# Patient Record
Sex: FEMALE | Race: OTHER | HISPANIC OR LATINO | URBAN - METROPOLITAN AREA
[De-identification: names, ages, dates, MRNs, and addresses within clinical notes are randomized per-mention and may not be internally consistent; named-entity substitution may affect disease eponyms.]

---

## 2017-07-17 ENCOUNTER — OUTPATIENT (OUTPATIENT)
Dept: OUTPATIENT SERVICES | Facility: HOSPITAL | Age: 32
LOS: 1 days | End: 2017-07-17
Payer: MEDICARE

## 2017-07-17 DIAGNOSIS — Z01.818 ENCOUNTER FOR OTHER PREPROCEDURAL EXAMINATION: ICD-10-CM

## 2017-07-17 LAB
ANION GAP SERPL CALC-SCNC: 13 MMOL/L — SIGNIFICANT CHANGE UP (ref 5–17)
APTT BLD: 26 SEC — LOW (ref 27.5–37.4)
BASOPHILS NFR BLD AUTO: 0.1 % — SIGNIFICANT CHANGE UP (ref 0–2)
BLD GP AB SCN SERPL QL: NEGATIVE — SIGNIFICANT CHANGE UP
BUN SERPL-MCNC: 10 MG/DL — SIGNIFICANT CHANGE UP (ref 7–23)
CALCIUM SERPL-MCNC: 9.6 MG/DL — SIGNIFICANT CHANGE UP (ref 8.4–10.5)
CHLORIDE SERPL-SCNC: 103 MMOL/L — SIGNIFICANT CHANGE UP (ref 96–108)
CO2 SERPL-SCNC: 22 MMOL/L — SIGNIFICANT CHANGE UP (ref 22–31)
CREAT SERPL-MCNC: 0.7 MG/DL — SIGNIFICANT CHANGE UP (ref 0.5–1.3)
EOSINOPHIL NFR BLD AUTO: 2 % — SIGNIFICANT CHANGE UP (ref 0–6)
GLUCOSE SERPL-MCNC: 96 MG/DL — SIGNIFICANT CHANGE UP (ref 70–99)
HCT VFR BLD CALC: 35.1 % — SIGNIFICANT CHANGE UP (ref 34.5–45)
HGB BLD-MCNC: 11.8 G/DL — SIGNIFICANT CHANGE UP (ref 11.5–15.5)
INR BLD: 1 — SIGNIFICANT CHANGE UP (ref 0.88–1.16)
LYMPHOCYTES # BLD AUTO: 23.3 % — SIGNIFICANT CHANGE UP (ref 13–44)
MCHC RBC-ENTMCNC: 27.6 PG — SIGNIFICANT CHANGE UP (ref 27–34)
MCHC RBC-ENTMCNC: 33.6 G/DL — SIGNIFICANT CHANGE UP (ref 32–36)
MCV RBC AUTO: 82.2 FL — SIGNIFICANT CHANGE UP (ref 80–100)
MONOCYTES NFR BLD AUTO: 9 % — SIGNIFICANT CHANGE UP (ref 2–14)
NEUTROPHILS NFR BLD AUTO: 65.6 % — SIGNIFICANT CHANGE UP (ref 43–77)
PLATELET # BLD AUTO: 298 K/UL — SIGNIFICANT CHANGE UP (ref 150–400)
POTASSIUM SERPL-MCNC: 4.4 MMOL/L — SIGNIFICANT CHANGE UP (ref 3.5–5.3)
POTASSIUM SERPL-SCNC: 4.4 MMOL/L — SIGNIFICANT CHANGE UP (ref 3.5–5.3)
PROTHROM AB SERPL-ACNC: 11.1 SEC — SIGNIFICANT CHANGE UP (ref 9.8–12.7)
RBC # BLD: 4.27 M/UL — SIGNIFICANT CHANGE UP (ref 3.8–5.2)
RBC # FLD: 14.2 % — SIGNIFICANT CHANGE UP (ref 10.3–16.9)
RH IG SCN BLD-IMP: POSITIVE — SIGNIFICANT CHANGE UP
SODIUM SERPL-SCNC: 138 MMOL/L — SIGNIFICANT CHANGE UP (ref 135–145)
WBC # BLD: 7.5 K/UL — SIGNIFICANT CHANGE UP (ref 3.8–10.5)
WBC # FLD AUTO: 7.5 K/UL — SIGNIFICANT CHANGE UP (ref 3.8–10.5)

## 2017-07-17 PROCEDURE — 85610 PROTHROMBIN TIME: CPT

## 2017-07-17 PROCEDURE — 85730 THROMBOPLASTIN TIME PARTIAL: CPT

## 2017-07-17 PROCEDURE — 86901 BLOOD TYPING SEROLOGIC RH(D): CPT

## 2017-07-17 PROCEDURE — 86900 BLOOD TYPING SEROLOGIC ABO: CPT

## 2017-07-17 PROCEDURE — 85025 COMPLETE CBC W/AUTO DIFF WBC: CPT

## 2017-07-17 PROCEDURE — 86850 RBC ANTIBODY SCREEN: CPT

## 2017-07-17 PROCEDURE — 80048 BASIC METABOLIC PNL TOTAL CA: CPT

## 2017-07-22 ENCOUNTER — INPATIENT (INPATIENT)
Facility: HOSPITAL | Age: 32
LOS: 3 days | Discharge: HOME | End: 2017-07-26
Attending: PEDIATRICS | Admitting: PEDIATRICS
Payer: COMMERCIAL

## 2017-07-22 VITALS — HEIGHT: 62 IN | WEIGHT: 209 LBS

## 2017-07-22 DIAGNOSIS — O26.899 OTHER SPECIFIED PREGNANCY RELATED CONDITIONS, UNSPECIFIED TRIMESTER: ICD-10-CM

## 2017-07-22 DIAGNOSIS — Z3A.00 WEEKS OF GESTATION OF PREGNANCY NOT SPECIFIED: ICD-10-CM

## 2017-07-22 LAB
BLD GP AB SCN SERPL QL: NEGATIVE — SIGNIFICANT CHANGE UP
RH IG SCN BLD-IMP: POSITIVE — SIGNIFICANT CHANGE UP

## 2017-07-22 RX ORDER — DOCUSATE SODIUM 100 MG
100 CAPSULE ORAL
Qty: 0 | Refills: 0 | Status: DISCONTINUED | OUTPATIENT
Start: 2017-07-22 | End: 2017-07-26

## 2017-07-22 RX ORDER — DIPHENHYDRAMINE HCL 50 MG
25 CAPSULE ORAL EVERY 6 HOURS
Qty: 0 | Refills: 0 | Status: DISCONTINUED | OUTPATIENT
Start: 2017-07-22 | End: 2017-07-26

## 2017-07-22 RX ORDER — NALOXONE HYDROCHLORIDE 4 MG/.1ML
0.1 SPRAY NASAL
Qty: 0 | Refills: 0 | Status: DISCONTINUED | OUTPATIENT
Start: 2017-07-22 | End: 2017-07-26

## 2017-07-22 RX ORDER — TETANUS TOXOID, REDUCED DIPHTHERIA TOXOID AND ACELLULAR PERTUSSIS VACCINE, ADSORBED 5; 2.5; 8; 8; 2.5 [IU]/.5ML; [IU]/.5ML; UG/.5ML; UG/.5ML; UG/.5ML
0.5 SUSPENSION INTRAMUSCULAR ONCE
Qty: 0 | Refills: 0 | Status: DISCONTINUED | OUTPATIENT
Start: 2017-07-22 | End: 2017-07-26

## 2017-07-22 RX ORDER — HEPARIN SODIUM 5000 [USP'U]/ML
5000 INJECTION INTRAVENOUS; SUBCUTANEOUS EVERY 12 HOURS
Qty: 0 | Refills: 0 | Status: DISCONTINUED | OUTPATIENT
Start: 2017-07-22 | End: 2017-07-26

## 2017-07-22 RX ORDER — LANOLIN
1 OINTMENT (GRAM) TOPICAL
Qty: 0 | Refills: 0 | Status: DISCONTINUED | OUTPATIENT
Start: 2017-07-22 | End: 2017-07-26

## 2017-07-22 RX ORDER — OXYCODONE AND ACETAMINOPHEN 5; 325 MG/1; MG/1
2 TABLET ORAL EVERY 6 HOURS
Qty: 0 | Refills: 0 | Status: DISCONTINUED | OUTPATIENT
Start: 2017-07-22 | End: 2017-07-26

## 2017-07-22 RX ORDER — ONDANSETRON 8 MG/1
4 TABLET, FILM COATED ORAL EVERY 6 HOURS
Qty: 0 | Refills: 0 | Status: DISCONTINUED | OUTPATIENT
Start: 2017-07-22 | End: 2017-07-26

## 2017-07-22 RX ORDER — MORPHINE SULFATE 50 MG/1
0.3 CAPSULE, EXTENDED RELEASE ORAL ONCE
Qty: 0 | Refills: 0 | Status: DISCONTINUED | OUTPATIENT
Start: 2017-07-22 | End: 2017-07-26

## 2017-07-22 RX ORDER — OXYCODONE AND ACETAMINOPHEN 5; 325 MG/1; MG/1
1 TABLET ORAL
Qty: 0 | Refills: 0 | Status: DISCONTINUED | OUTPATIENT
Start: 2017-07-22 | End: 2017-07-26

## 2017-07-22 RX ORDER — OXYTOCIN 10 UNIT/ML
41.67 VIAL (ML) INJECTION
Qty: 20 | Refills: 0 | Status: DISCONTINUED | OUTPATIENT
Start: 2017-07-22 | End: 2017-07-26

## 2017-07-22 RX ORDER — SODIUM CHLORIDE 9 MG/ML
1000 INJECTION, SOLUTION INTRAVENOUS
Qty: 0 | Refills: 0 | Status: DISCONTINUED | OUTPATIENT
Start: 2017-07-22 | End: 2017-07-22

## 2017-07-22 RX ORDER — FERROUS SULFATE 325(65) MG
325 TABLET ORAL DAILY
Qty: 0 | Refills: 0 | Status: DISCONTINUED | OUTPATIENT
Start: 2017-07-22 | End: 2017-07-24

## 2017-07-22 RX ORDER — SIMETHICONE 80 MG/1
80 TABLET, CHEWABLE ORAL EVERY 4 HOURS
Qty: 0 | Refills: 0 | Status: DISCONTINUED | OUTPATIENT
Start: 2017-07-22 | End: 2017-07-26

## 2017-07-22 RX ORDER — CITRIC ACID/SODIUM CITRATE 300-500 MG
30 SOLUTION, ORAL ORAL ONCE
Qty: 0 | Refills: 0 | Status: COMPLETED | OUTPATIENT
Start: 2017-07-22 | End: 2017-07-22

## 2017-07-22 RX ORDER — METOCLOPRAMIDE HCL 10 MG
10 TABLET ORAL ONCE
Qty: 0 | Refills: 0 | Status: DISCONTINUED | OUTPATIENT
Start: 2017-07-22 | End: 2017-07-22

## 2017-07-22 RX ORDER — IBUPROFEN 200 MG
600 TABLET ORAL ONCE
Qty: 0 | Refills: 0 | Status: COMPLETED | OUTPATIENT
Start: 2017-07-22 | End: 2017-07-23

## 2017-07-22 RX ORDER — SODIUM CHLORIDE 9 MG/ML
1000 INJECTION, SOLUTION INTRAVENOUS ONCE
Qty: 0 | Refills: 0 | Status: COMPLETED | OUTPATIENT
Start: 2017-07-22 | End: 2017-07-22

## 2017-07-22 RX ORDER — ACETAMINOPHEN 500 MG
650 TABLET ORAL EVERY 6 HOURS
Qty: 0 | Refills: 0 | Status: DISCONTINUED | OUTPATIENT
Start: 2017-07-22 | End: 2017-07-26

## 2017-07-22 RX ORDER — GLYCERIN ADULT
1 SUPPOSITORY, RECTAL RECTAL AT BEDTIME
Qty: 0 | Refills: 0 | Status: DISCONTINUED | OUTPATIENT
Start: 2017-07-22 | End: 2017-07-26

## 2017-07-22 RX ORDER — SODIUM CHLORIDE 9 MG/ML
1000 INJECTION, SOLUTION INTRAVENOUS
Qty: 0 | Refills: 0 | Status: DISCONTINUED | OUTPATIENT
Start: 2017-07-22 | End: 2017-07-26

## 2017-07-22 RX ORDER — CEFAZOLIN SODIUM 1 G
2000 VIAL (EA) INJECTION ONCE
Qty: 0 | Refills: 0 | Status: COMPLETED | OUTPATIENT
Start: 2017-07-22 | End: 2017-07-22

## 2017-07-22 RX ORDER — IBUPROFEN 200 MG
600 TABLET ORAL EVERY 6 HOURS
Qty: 0 | Refills: 0 | Status: DISCONTINUED | OUTPATIENT
Start: 2017-07-22 | End: 2017-07-26

## 2017-07-22 RX ADMIN — SODIUM CHLORIDE 2000 MILLILITER(S): 9 INJECTION, SOLUTION INTRAVENOUS at 14:26

## 2017-07-22 RX ADMIN — Medication 100 MILLIGRAM(S): at 14:27

## 2017-07-22 RX ADMIN — Medication 30 MILLILITER(S): at 14:27

## 2017-07-23 LAB
BASOPHILS NFR BLD AUTO: 0.1 % — SIGNIFICANT CHANGE UP (ref 0–2)
EOSINOPHIL NFR BLD AUTO: 0.3 % — SIGNIFICANT CHANGE UP (ref 0–6)
HCT VFR BLD CALC: 26.8 % — LOW (ref 34.5–45)
HGB BLD-MCNC: 8.8 G/DL — LOW (ref 11.5–15.5)
LYMPHOCYTES # BLD AUTO: 11.8 % — LOW (ref 13–44)
MCHC RBC-ENTMCNC: 27.8 PG — SIGNIFICANT CHANGE UP (ref 27–34)
MCHC RBC-ENTMCNC: 32.8 G/DL — SIGNIFICANT CHANGE UP (ref 32–36)
MCV RBC AUTO: 84.5 FL — SIGNIFICANT CHANGE UP (ref 80–100)
MONOCYTES NFR BLD AUTO: 9.7 % — SIGNIFICANT CHANGE UP (ref 2–14)
NEUTROPHILS NFR BLD AUTO: 78.1 % — HIGH (ref 43–77)
PLATELET # BLD AUTO: 205 K/UL — SIGNIFICANT CHANGE UP (ref 150–400)
RBC # BLD: 3.17 M/UL — LOW (ref 3.8–5.2)
RBC # FLD: 14.1 % — SIGNIFICANT CHANGE UP (ref 10.3–16.9)
T PALLIDUM AB TITR SER: NEGATIVE — SIGNIFICANT CHANGE UP
WBC # BLD: 10.1 K/UL — SIGNIFICANT CHANGE UP (ref 3.8–10.5)
WBC # FLD AUTO: 10.1 K/UL — SIGNIFICANT CHANGE UP (ref 3.8–10.5)

## 2017-07-23 RX ADMIN — Medication 1 TABLET(S): at 11:51

## 2017-07-23 RX ADMIN — Medication 325 MILLIGRAM(S): at 11:50

## 2017-07-23 RX ADMIN — SODIUM CHLORIDE 125 MILLILITER(S): 9 INJECTION, SOLUTION INTRAVENOUS at 03:19

## 2017-07-23 RX ADMIN — Medication 600 MILLIGRAM(S): at 11:51

## 2017-07-23 RX ADMIN — Medication 600 MILLIGRAM(S): at 12:59

## 2017-07-23 RX ADMIN — Medication 600 MILLIGRAM(S): at 05:52

## 2017-07-23 RX ADMIN — HEPARIN SODIUM 5000 UNIT(S): 5000 INJECTION INTRAVENOUS; SUBCUTANEOUS at 05:53

## 2017-07-23 RX ADMIN — Medication 600 MILLIGRAM(S): at 19:22

## 2017-07-23 RX ADMIN — HEPARIN SODIUM 5000 UNIT(S): 5000 INJECTION INTRAVENOUS; SUBCUTANEOUS at 18:21

## 2017-07-23 RX ADMIN — Medication 600 MILLIGRAM(S): at 18:21

## 2017-07-23 RX ADMIN — Medication 600 MILLIGRAM(S): at 06:44

## 2017-07-23 NOTE — PROGRESS NOTE ADULT - SUBJECTIVE AND OBJECTIVE BOX
Patient evaluated at bedside. No acute events overnight. She reports pain is well controlled with OPM. Adequate urine output. Has not yet had anything PO nor attempted ambulation. Has not passed flatus as of yet. She denies headache, dizziness, chest pain, palpitations, shortness of breathe, nausea, vomiting or heavy vaginal bleeding.      Physical Exam:  Vital Signs Last 24 Hrs  T(C): 37.1 (23 Jul 2017 06:00), Max: 37.1 (23 Jul 2017 06:00)  T(F): 98.8 (23 Jul 2017 06:00), Max: 98.8 (23 Jul 2017 06:00)  HR: 95 (23 Jul 2017 06:00) (86 - 110)  BP: 115/80 (23 Jul 2017 06:00) (97/52 - 123/57)  BP(mean): --  RR: 18 (23 Jul 2017 06:00) (16 - 24)  SpO2: 96% (23 Jul 2017 06:00) (94% - 99%)    GA: NAD, A+0 x 3  Abd: + BS, soft, nontender, nondistended, no rebound or guarding, uterus firm at midline, 2 fb below umbilicus  Incision clean, dry and intact - prevena  : lochia WNL  Extremities: no swelling or calf tenderness                            8.8    10.1  )-----------( 205      ( 23 Jul 2017 06:02 )             26.8

## 2017-07-23 NOTE — PROGRESS NOTE ADULT - ASSESSMENT
A/P  yo 32y s/p c/s, POD #1  ,stable    Diet: clears, advance with flatus  Pain: OPM  IV fluid: Saline lock  OOB/SCDs/IS  SQH 5000 BID  TOV  f/u AM labs  Continue to monitor  Anticipate discharge POD #3 or #4

## 2017-07-23 NOTE — PROGRESS NOTE ADULT - SUBJECTIVE AND OBJECTIVE BOX
Pt evaluated at bedside given recent drop in Hgb (11.8> 8.8).  Pt reports she is feeling good and has no complaints.  Denies heart palpitations, chest pain, weakness, difficulty with ambulation, lightheadness/dizziness    Reports urinating frequently without difficulty.  Urine Output 600> 100> 400    Physical Exam:  -826/67-80  HR   CV: s1, s2, no m/r/g  Resp: normal breath sounds throughout, no wheezes/rhonchi/rales  Abd: soft, non tender, BS+  Ext: no edema/erythema/tenderness    A/P:  Continue to monitor for signs of anemia.  F/u on morning CBC.

## 2017-07-24 LAB
HCT VFR BLD CALC: 28 % — LOW (ref 34.5–45)
HGB BLD-MCNC: 9.1 G/DL — LOW (ref 11.5–15.5)
MCHC RBC-ENTMCNC: 27.8 PG — SIGNIFICANT CHANGE UP (ref 27–34)
MCHC RBC-ENTMCNC: 32.5 G/DL — SIGNIFICANT CHANGE UP (ref 32–36)
MCV RBC AUTO: 85.6 FL — SIGNIFICANT CHANGE UP (ref 80–100)
PLATELET # BLD AUTO: 284 K/UL — SIGNIFICANT CHANGE UP (ref 150–400)
RBC # BLD: 3.27 M/UL — LOW (ref 3.8–5.2)
RBC # FLD: 14.6 % — SIGNIFICANT CHANGE UP (ref 10.3–16.9)
WBC # BLD: 17 K/UL — HIGH (ref 3.8–10.5)
WBC # FLD AUTO: 17 K/UL — HIGH (ref 3.8–10.5)

## 2017-07-24 RX ORDER — FERROUS SULFATE 325(65) MG
325 TABLET ORAL
Qty: 0 | Refills: 0 | Status: DISCONTINUED | OUTPATIENT
Start: 2017-07-24 | End: 2017-07-26

## 2017-07-24 RX ADMIN — HEPARIN SODIUM 5000 UNIT(S): 5000 INJECTION INTRAVENOUS; SUBCUTANEOUS at 05:57

## 2017-07-24 RX ADMIN — Medication 600 MILLIGRAM(S): at 00:12

## 2017-07-24 RX ADMIN — Medication 100 MILLIGRAM(S): at 05:58

## 2017-07-24 RX ADMIN — Medication 600 MILLIGRAM(S): at 18:23

## 2017-07-24 RX ADMIN — Medication 600 MILLIGRAM(S): at 01:00

## 2017-07-24 RX ADMIN — Medication 600 MILLIGRAM(S): at 12:27

## 2017-07-24 RX ADMIN — Medication 1 TABLET(S): at 12:27

## 2017-07-24 RX ADMIN — Medication 600 MILLIGRAM(S): at 13:03

## 2017-07-24 RX ADMIN — Medication 600 MILLIGRAM(S): at 05:58

## 2017-07-24 RX ADMIN — HEPARIN SODIUM 5000 UNIT(S): 5000 INJECTION INTRAVENOUS; SUBCUTANEOUS at 18:23

## 2017-07-24 RX ADMIN — Medication 325 MILLIGRAM(S): at 12:27

## 2017-07-24 RX ADMIN — Medication 600 MILLIGRAM(S): at 06:45

## 2017-07-24 RX ADMIN — Medication 600 MILLIGRAM(S): at 19:21

## 2017-07-24 RX ADMIN — Medication 325 MILLIGRAM(S): at 21:20

## 2017-07-24 NOTE — PROGRESS NOTE ADULT - ASSESSMENT
A/P  yo 32y s/p c/s, POD #2 ,stable    Diet: reg  Pain: OPM  IV fluid: Saline lock  Anemia - Fe TID  OOB/SCDs/IS  SQH 5000 BID  Continue to monitor  Anticipate discharge POD #3 or #4

## 2017-07-24 NOTE — PROGRESS NOTE ADULT - ASSESSMENT
POD 2 with blood loss from extensive ALBIN  For repeat CBC today and plan regarding transfusion pending results and symptoms.

## 2017-07-24 NOTE — PROGRESS NOTE ADULT - SUBJECTIVE AND OBJECTIVE BOX
Patient evaluated at bedside. She reports pain is well controlled with OPM. She has been ambulating without assistance, voiding spontaneously, passing gas, tolerating regular diet. She denies headache, dizziness, chest pain, palpitations, shortness of breathe, nausea, vomiting or heavy vaginal bleeding.      Physical Exam:  Vital Signs Last 24 Hrs  T(C): 36.8 (24 Jul 2017 05:16), Max: 36.8 (24 Jul 2017 05:16)  T(F): 98.3 (24 Jul 2017 05:16), Max: 98.3 (24 Jul 2017 05:16)  HR: 101 (24 Jul 2017 05:16) (92 - 101)  BP: 128/78 (24 Jul 2017 05:16) (114/67 - 128/78)  BP(mean): --  RR: 18 (24 Jul 2017 05:16) (18 - 20)  SpO2: 97% (24 Jul 2017 05:16) (96% - 99%)    GA: NAD, A+0 x 3  Abd: + BS, soft, nontender, nondistended, no rebound or guarding, uterus firm at midline, 2 fb below umbilicus  Incision clean, dry and intact - prevena  : lochia WNL  Extremities: no swelling or calf tenderness                            9.1    17.0  )-----------( 284      ( 24 Jul 2017 06:22 )             28.0

## 2017-07-24 NOTE — PROGRESS NOTE ADULT - SUBJECTIVE AND OBJECTIVE BOX
Notes pain but otherwise no complaints. Declines to take percocet  Denies signs of anemia at this time but is also not ambulating much    Vital Signs Last 24 Hrs  T(C): 36.8 (24 Jul 2017 05:16), Max: 36.8 (24 Jul 2017 05:16)  T(F): 98.3 (24 Jul 2017 05:16), Max: 98.3 (24 Jul 2017 05:16)  HR: 101 (24 Jul 2017 05:16) (92 - 101)  BP: 128/78 (24 Jul 2017 05:16) (104/55 - 128/78)  BP(mean): --  RR: 18 (24 Jul 2017 05:16) (17 - 20)  SpO2: 97% (24 Jul 2017 05:16) (96% - 99%)      Sitting in chair.   Morbidly obese  Abdomen non tender non distended.   Vacuum on and working.   No C/C/E

## 2017-07-25 RX ADMIN — Medication 600 MILLIGRAM(S): at 17:00

## 2017-07-25 RX ADMIN — Medication 325 MILLIGRAM(S): at 17:46

## 2017-07-25 RX ADMIN — Medication 600 MILLIGRAM(S): at 07:06

## 2017-07-25 RX ADMIN — Medication 100 MILLIGRAM(S): at 12:19

## 2017-07-25 RX ADMIN — Medication 600 MILLIGRAM(S): at 16:06

## 2017-07-25 RX ADMIN — Medication 650 MILLIGRAM(S): at 20:58

## 2017-07-25 RX ADMIN — Medication 325 MILLIGRAM(S): at 07:53

## 2017-07-25 RX ADMIN — HEPARIN SODIUM 5000 UNIT(S): 5000 INJECTION INTRAVENOUS; SUBCUTANEOUS at 17:46

## 2017-07-25 RX ADMIN — Medication 325 MILLIGRAM(S): at 12:19

## 2017-07-25 RX ADMIN — Medication 600 MILLIGRAM(S): at 01:09

## 2017-07-25 RX ADMIN — Medication 600 MILLIGRAM(S): at 06:16

## 2017-07-25 RX ADMIN — Medication 1 TABLET(S): at 12:19

## 2017-07-25 RX ADMIN — HEPARIN SODIUM 5000 UNIT(S): 5000 INJECTION INTRAVENOUS; SUBCUTANEOUS at 06:16

## 2017-07-25 RX ADMIN — Medication 600 MILLIGRAM(S): at 00:14

## 2017-07-25 RX ADMIN — Medication 100 MILLIGRAM(S): at 00:14

## 2017-07-25 NOTE — PROGRESS NOTE ADULT - SUBJECTIVE AND OBJECTIVE BOX
Patient evaluated at bedside. She reports pain is well controlled with OPM. She has been ambulating without assistance, voiding spontaneously, passing gas, tolerating regular diet. She denies headache, dizziness, chest pain, palpitations, shortness of breathe, nausea, vomiting or heavy vaginal bleeding.      Physical Exam:  Vital Signs Last 24 Hrs  T(C): 36.6 (25 Jul 2017 06:00), Max: 36.6 (25 Jul 2017 06:00)  T(F): 97.8 (25 Jul 2017 06:00), Max: 97.8 (25 Jul 2017 06:00)  HR: 105 (25 Jul 2017 06:00) (80 - 105)  BP: 118/66 (25 Jul 2017 06:00) (104/58 - 118/66)  BP(mean): --  RR: 18 (25 Jul 2017 06:00) (17 - 18)  SpO2: 96% (25 Jul 2017 06:00) (96% - 99%)    GA: NAD, A+0 x 3  Abd: + BS, soft, nontender, nondistended, no rebound or guarding, uterus firm at midline, 2 fb below umbilicus  Incision clean, dry and intact - Prevena   : lochia WNL  Extremities: no swelling or calf tenderness                            9.1    17.0  )-----------( 284      ( 24 Jul 2017 06:22 )             28.0

## 2017-07-25 NOTE — PROGRESS NOTE ADULT - ASSESSMENT
A/P  yo 32y s/p c/s, POD #3 ,stable    Diet: reg  Pain: OPM  IV fluid: Saline lock  Anemia - Fe TID  OOB/SCDs/IS  SQH 5000 BID  Continue to monitor  Anticipate discharge POD #3 or #4

## 2017-07-26 ENCOUNTER — TRANSCRIPTION ENCOUNTER (OUTPATIENT)
Age: 32
End: 2017-07-26

## 2017-07-26 VITALS
SYSTOLIC BLOOD PRESSURE: 118 MMHG | DIASTOLIC BLOOD PRESSURE: 63 MMHG | TEMPERATURE: 97 F | HEART RATE: 85 BPM | OXYGEN SATURATION: 100 % | RESPIRATION RATE: 17 BRPM

## 2017-07-26 PROCEDURE — 85027 COMPLETE CBC AUTOMATED: CPT

## 2017-07-26 PROCEDURE — 86901 BLOOD TYPING SEROLOGIC RH(D): CPT

## 2017-07-26 PROCEDURE — C1889: CPT

## 2017-07-26 PROCEDURE — 86850 RBC ANTIBODY SCREEN: CPT

## 2017-07-26 PROCEDURE — 99214 OFFICE O/P EST MOD 30 MIN: CPT

## 2017-07-26 PROCEDURE — 85025 COMPLETE CBC W/AUTO DIFF WBC: CPT

## 2017-07-26 PROCEDURE — 88307 TISSUE EXAM BY PATHOLOGIST: CPT

## 2017-07-26 PROCEDURE — 86780 TREPONEMA PALLIDUM: CPT

## 2017-07-26 PROCEDURE — 86900 BLOOD TYPING SEROLOGIC ABO: CPT

## 2017-07-26 PROCEDURE — 36415 COLL VENOUS BLD VENIPUNCTURE: CPT

## 2017-07-26 RX ORDER — ACETAMINOPHEN 500 MG
650 TABLET ORAL EVERY 6 HOURS
Qty: 0 | Refills: 0 | Status: DISCONTINUED | OUTPATIENT
Start: 2017-07-26 | End: 2017-07-26

## 2017-07-26 RX ADMIN — Medication 600 MILLIGRAM(S): at 01:20

## 2017-07-26 RX ADMIN — Medication 325 MILLIGRAM(S): at 13:00

## 2017-07-26 RX ADMIN — Medication 600 MILLIGRAM(S): at 06:13

## 2017-07-26 RX ADMIN — Medication 325 MILLIGRAM(S): at 08:03

## 2017-07-26 RX ADMIN — Medication 1 TABLET(S): at 13:00

## 2017-07-26 RX ADMIN — Medication 100 MILLIGRAM(S): at 00:24

## 2017-07-26 RX ADMIN — Medication 600 MILLIGRAM(S): at 00:24

## 2017-07-26 RX ADMIN — Medication 600 MILLIGRAM(S): at 14:10

## 2017-07-26 RX ADMIN — HEPARIN SODIUM 5000 UNIT(S): 5000 INJECTION INTRAVENOUS; SUBCUTANEOUS at 06:12

## 2017-07-26 RX ADMIN — Medication 600 MILLIGRAM(S): at 13:00

## 2017-07-26 RX ADMIN — Medication 600 MILLIGRAM(S): at 07:10

## 2017-07-26 NOTE — PROGRESS NOTE ADULT - SUBJECTIVE AND OBJECTIVE BOX
Patient evaluated at bedside. She reports pain is well controlled with OPM. She has been ambulating without assistance, voiding spontaneously, passing gas, tolerating regular diet. She denies headache, dizziness, chest pain, palpitations, shortness of breathe, nausea, vomiting or heavy vaginal bleeding.      Physical Exam:  Vital Signs Last 24 Hrs  T(C): 36.2 (26 Jul 2017 10:44), Max: 36.9 (25 Jul 2017 18:31)  T(F): 97.2 (26 Jul 2017 10:44), Max: 98.4 (25 Jul 2017 18:31)  HR: 85 (26 Jul 2017 10:44) (82 - 97)  BP: 118/63 (26 Jul 2017 10:44) (116/76 - 131/60)  BP(mean): --  RR: 17 (26 Jul 2017 10:44) (16 - 18)  SpO2: 100% (26 Jul 2017 10:44) (99% - 100%)    GA: NAD, A+0 x 3  CV: RRR  Pulm: CTAB  Abd: + BS, soft, nontender, nondistended, no rebound or guarding, uterus firm at midline, 2 fb below umbilicus  Incision clean, dry and intact -prevena   : lochia WNL  Extremities: no swelling or calf tenderness

## 2017-07-26 NOTE — PROGRESS NOTE ADULT - ASSESSMENT
A/P  yo 32y s/p c/s, POD # 4  ,stable    Diet: Regular diet  Pain: OPM  IV fluid: Saline lock  OOB/SCDs/IS  SQH 5000 BID  Continue to monitor  Anticipate discharge today  Pt declines percocet script

## 2017-07-26 NOTE — DISCHARGE NOTE OB - PATIENT PORTAL LINK FT
“You can access the FollowHealth Patient Portal, offered by [facility], by registering with the following website: [email or web address]”

## 2017-07-26 NOTE — DISCHARGE NOTE OB - CARE PROVIDER_API CALL
Kenyon Carrion), Obstetrics and Gynecology  [address] [place] [address]  Phone: [phone]  Fax: [phone]

## 2017-07-26 NOTE — DISCHARGE NOTE OB - CARE PLAN
Principal Discharge DX:	 delivery delivered  Goal:	be happy  Instructions for follow-up, activity and diet:	follow up with OBGYN in 6 weeks, pelvic rest for 6 weeks, normal healthy diet

## 2017-07-27 LAB — SURGICAL PATHOLOGY STUDY: SIGNIFICANT CHANGE UP

## 2017-07-28 DIAGNOSIS — D50.9 IRON DEFICIENCY ANEMIA, UNSPECIFIED: ICD-10-CM

## 2017-07-28 DIAGNOSIS — O34.211 MATERNAL CARE FOR LOW TRANSVERSE SCAR FROM PREVIOUS CESAREAN DELIVERY: ICD-10-CM

## 2017-07-28 DIAGNOSIS — E66.01 MORBID (SEVERE) OBESITY DUE TO EXCESS CALORIES: ICD-10-CM

## 2017-07-28 DIAGNOSIS — N73.6 FEMALE PELVIC PERITONEAL ADHESIONS (POSTINFECTIVE): ICD-10-CM

## 2017-07-28 DIAGNOSIS — Z3A.38 38 WEEKS GESTATION OF PREGNANCY: ICD-10-CM

## 2017-07-28 DIAGNOSIS — O99.89 OTHER SPECIFIED DISEASES AND CONDITIONS COMPLICATING PREGNANCY, CHILDBIRTH AND THE PUERPERIUM: ICD-10-CM

## 2017-07-28 DIAGNOSIS — Z34.83 ENCOUNTER FOR SUPERVISION OF OTHER NORMAL PREGNANCY, THIRD TRIMESTER: ICD-10-CM

## 2019-12-05 ENCOUNTER — APPOINTMENT (OUTPATIENT)
Dept: ANTEPARTUM | Facility: CLINIC | Age: 34
End: 2019-12-05
Payer: COMMERCIAL

## 2019-12-05 PROCEDURE — 76813 OB US NUCHAL MEAS 1 GEST: CPT

## 2019-12-05 PROCEDURE — 76801 OB US < 14 WKS SINGLE FETUS: CPT

## 2020-01-08 ENCOUNTER — APPOINTMENT (OUTPATIENT)
Dept: ANTEPARTUM | Facility: CLINIC | Age: 35
End: 2020-01-08
Payer: COMMERCIAL

## 2020-01-08 PROCEDURE — 76811 OB US DETAILED SNGL FETUS: CPT

## 2020-01-08 PROCEDURE — 76817 TRANSVAGINAL US OBSTETRIC: CPT

## 2020-02-05 ENCOUNTER — APPOINTMENT (OUTPATIENT)
Dept: ANTEPARTUM | Facility: CLINIC | Age: 35
End: 2020-02-05
Payer: COMMERCIAL

## 2020-02-05 PROCEDURE — 76811 OB US DETAILED SNGL FETUS: CPT

## 2020-02-05 PROCEDURE — 76817 TRANSVAGINAL US OBSTETRIC: CPT

## 2020-02-12 ENCOUNTER — APPOINTMENT (OUTPATIENT)
Dept: ANTEPARTUM | Facility: CLINIC | Age: 35
End: 2020-02-12
Payer: COMMERCIAL

## 2020-02-12 PROCEDURE — 76815 OB US LIMITED FETUS(S): CPT

## 2020-04-01 ENCOUNTER — APPOINTMENT (OUTPATIENT)
Dept: ANTEPARTUM | Facility: CLINIC | Age: 35
End: 2020-04-01

## 2020-04-24 ENCOUNTER — APPOINTMENT (OUTPATIENT)
Dept: ANTEPARTUM | Facility: CLINIC | Age: 35
End: 2020-04-24
Payer: COMMERCIAL

## 2020-04-24 ENCOUNTER — TRANSCRIPTION ENCOUNTER (OUTPATIENT)
Age: 35
End: 2020-04-24

## 2020-04-24 PROCEDURE — 76816 OB US FOLLOW-UP PER FETUS: CPT

## 2020-04-24 PROCEDURE — 76819 FETAL BIOPHYS PROFIL W/O NST: CPT

## 2020-04-24 PROCEDURE — 76817 TRANSVAGINAL US OBSTETRIC: CPT

## 2020-06-05 ENCOUNTER — APPOINTMENT (OUTPATIENT)
Dept: ANTEPARTUM | Facility: CLINIC | Age: 35
End: 2020-06-05
Payer: COMMERCIAL

## 2020-06-05 PROCEDURE — 76816 OB US FOLLOW-UP PER FETUS: CPT

## 2020-06-05 PROCEDURE — 76819 FETAL BIOPHYS PROFIL W/O NST: CPT

## 2020-06-12 ENCOUNTER — APPOINTMENT (OUTPATIENT)
Dept: ANTEPARTUM | Facility: CLINIC | Age: 35
End: 2020-06-12
Payer: COMMERCIAL

## 2020-06-12 PROCEDURE — 76816 OB US FOLLOW-UP PER FETUS: CPT

## 2020-06-12 PROCEDURE — 76819 FETAL BIOPHYS PROFIL W/O NST: CPT

## 2020-06-15 ENCOUNTER — OUTPATIENT (OUTPATIENT)
Dept: OUTPATIENT SERVICES | Facility: HOSPITAL | Age: 35
LOS: 1 days | End: 2020-06-15
Payer: COMMERCIAL

## 2020-06-15 ENCOUNTER — TRANSCRIPTION ENCOUNTER (OUTPATIENT)
Age: 35
End: 2020-06-15

## 2020-06-15 DIAGNOSIS — Z01.818 ENCOUNTER FOR OTHER PREPROCEDURAL EXAMINATION: ICD-10-CM

## 2020-06-15 LAB
BLD GP AB SCN SERPL QL: NEGATIVE — SIGNIFICANT CHANGE UP
BLD GP AB SCN SERPL QL: NEGATIVE — SIGNIFICANT CHANGE UP
HCT VFR BLD CALC: 37.8 % — SIGNIFICANT CHANGE UP (ref 34.5–45)
HGB BLD-MCNC: 11.8 G/DL — SIGNIFICANT CHANGE UP (ref 11.5–15.5)
MCHC RBC-ENTMCNC: 26.6 PG — LOW (ref 27–34)
MCHC RBC-ENTMCNC: 31.2 GM/DL — LOW (ref 32–36)
MCV RBC AUTO: 85.3 FL — SIGNIFICANT CHANGE UP (ref 80–100)
NRBC # BLD: 0 /100 WBCS — SIGNIFICANT CHANGE UP (ref 0–0)
PLATELET # BLD AUTO: 270 K/UL — SIGNIFICANT CHANGE UP (ref 150–400)
RBC # BLD: 4.43 M/UL — SIGNIFICANT CHANGE UP (ref 3.8–5.2)
RBC # FLD: 19.1 % — HIGH (ref 10.3–14.5)
RH IG SCN BLD-IMP: POSITIVE — SIGNIFICANT CHANGE UP
RH IG SCN BLD-IMP: POSITIVE — SIGNIFICANT CHANGE UP
WBC # BLD: 8.31 K/UL — SIGNIFICANT CHANGE UP (ref 3.8–10.5)
WBC # FLD AUTO: 8.31 K/UL — SIGNIFICANT CHANGE UP (ref 3.8–10.5)

## 2020-06-15 PROCEDURE — 86780 TREPONEMA PALLIDUM: CPT

## 2020-06-15 PROCEDURE — 86850 RBC ANTIBODY SCREEN: CPT

## 2020-06-15 PROCEDURE — 86901 BLOOD TYPING SEROLOGIC RH(D): CPT

## 2020-06-15 PROCEDURE — 85027 COMPLETE CBC AUTOMATED: CPT

## 2020-06-16 ENCOUNTER — RESULT REVIEW (OUTPATIENT)
Age: 35
End: 2020-06-16

## 2020-06-16 ENCOUNTER — INPATIENT (INPATIENT)
Facility: HOSPITAL | Age: 35
LOS: 1 days | Discharge: HOME | End: 2020-06-18
Attending: OBSTETRICS & GYNECOLOGY | Admitting: OBSTETRICS & GYNECOLOGY
Payer: COMMERCIAL

## 2020-06-16 ENCOUNTER — TRANSCRIPTION ENCOUNTER (OUTPATIENT)
Age: 35
End: 2020-06-16

## 2020-06-16 VITALS — WEIGHT: 207.23 LBS | HEIGHT: 62 IN

## 2020-06-16 DIAGNOSIS — Z23 ENCOUNTER FOR IMMUNIZATION: ICD-10-CM
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
## 2020-06-16 DIAGNOSIS — O34.211 MATERNAL CARE FOR LOW TRANSVERSE SCAR FROM PREVIOUS CESAREAN DELIVERY: ICD-10-CM

## 2020-06-16 DIAGNOSIS — Z3A.39 39 WEEKS GESTATION OF PREGNANCY: ICD-10-CM

## 2020-06-16 DIAGNOSIS — N99.4 POSTPROCEDURAL PELVIC PERITONEAL ADHESIONS: ICD-10-CM
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
## 2020-06-16 DIAGNOSIS — O99.89 OTHER SPECIFIED DISEASES AND CONDITIONS COMPLICATING PREGNANCY, CHILDBIRTH AND THE PUERPERIUM: ICD-10-CM

## 2020-06-16 LAB — T PALLIDUM AB TITR SER: NEGATIVE — SIGNIFICANT CHANGE UP

## 2020-06-16 PROCEDURE — 88307 TISSUE EXAM BY PATHOLOGIST: CPT | Mod: 26

## 2020-06-16 RX ORDER — FAMOTIDINE 10 MG/ML
20 INJECTION INTRAVENOUS ONCE
Refills: 0 | Status: DISCONTINUED | OUTPATIENT
Start: 2020-06-16 | End: 2020-06-16

## 2020-06-16 RX ORDER — TETANUS TOXOID, REDUCED DIPHTHERIA TOXOID AND ACELLULAR PERTUSSIS VACCINE, ADSORBED 5; 2.5; 8; 8; 2.5 [IU]/.5ML; [IU]/.5ML; UG/.5ML; UG/.5ML; UG/.5ML
0.5 SUSPENSION INTRAMUSCULAR ONCE
Refills: 0 | Status: COMPLETED | OUTPATIENT
Start: 2020-06-16

## 2020-06-16 RX ORDER — METOCLOPRAMIDE HCL 10 MG
10 TABLET ORAL ONCE
Refills: 0 | Status: DISCONTINUED | OUTPATIENT
Start: 2020-06-16 | End: 2020-06-16

## 2020-06-16 RX ORDER — NALOXONE HYDROCHLORIDE 4 MG/.1ML
0.1 SPRAY NASAL
Refills: 0 | Status: DISCONTINUED | OUTPATIENT
Start: 2020-06-16 | End: 2020-06-16

## 2020-06-16 RX ORDER — MORPHINE SULFATE 50 MG/1
0.2 CAPSULE, EXTENDED RELEASE ORAL ONCE
Refills: 0 | Status: DISCONTINUED | OUTPATIENT
Start: 2020-06-16 | End: 2020-06-16

## 2020-06-16 RX ORDER — OXYCODONE HYDROCHLORIDE 5 MG/1
5 TABLET ORAL
Refills: 0 | Status: DISCONTINUED | OUTPATIENT
Start: 2020-06-16 | End: 2020-06-18

## 2020-06-16 RX ORDER — SIMETHICONE 80 MG/1
80 TABLET, CHEWABLE ORAL EVERY 4 HOURS
Refills: 0 | Status: DISCONTINUED | OUTPATIENT
Start: 2020-06-16 | End: 2020-06-18

## 2020-06-16 RX ORDER — SODIUM CHLORIDE 9 MG/ML
1000 INJECTION, SOLUTION INTRAVENOUS
Refills: 0 | Status: DISCONTINUED | OUTPATIENT
Start: 2020-06-16 | End: 2020-06-16

## 2020-06-16 RX ORDER — OXYTOCIN 10 UNIT/ML
333.33 VIAL (ML) INJECTION
Qty: 20 | Refills: 0 | Status: DISCONTINUED | OUTPATIENT
Start: 2020-06-16 | End: 2020-06-16

## 2020-06-16 RX ORDER — ACETAMINOPHEN 500 MG
975 TABLET ORAL
Refills: 0 | Status: DISCONTINUED | OUTPATIENT
Start: 2020-06-16 | End: 2020-06-18

## 2020-06-16 RX ORDER — OXYTOCIN 10 UNIT/ML
333.33 VIAL (ML) INJECTION
Qty: 20 | Refills: 0 | Status: DISCONTINUED | OUTPATIENT
Start: 2020-06-16 | End: 2020-06-18

## 2020-06-16 RX ORDER — LANOLIN
1 OINTMENT (GRAM) TOPICAL EVERY 6 HOURS
Refills: 0 | Status: DISCONTINUED | OUTPATIENT
Start: 2020-06-16 | End: 2020-06-18

## 2020-06-16 RX ORDER — SODIUM CHLORIDE 9 MG/ML
1000 INJECTION, SOLUTION INTRAVENOUS ONCE
Refills: 0 | Status: DISCONTINUED | OUTPATIENT
Start: 2020-06-16 | End: 2020-06-16

## 2020-06-16 RX ORDER — ONDANSETRON 8 MG/1
4 TABLET, FILM COATED ORAL EVERY 6 HOURS
Refills: 0 | Status: DISCONTINUED | OUTPATIENT
Start: 2020-06-16 | End: 2020-06-16

## 2020-06-16 RX ORDER — CITRIC ACID/SODIUM CITRATE 300-500 MG
30 SOLUTION, ORAL ORAL ONCE
Refills: 0 | Status: DISCONTINUED | OUTPATIENT
Start: 2020-06-16 | End: 2020-06-16

## 2020-06-16 RX ORDER — OXYCODONE HYDROCHLORIDE 5 MG/1
5 TABLET ORAL ONCE
Refills: 0 | Status: DISCONTINUED | OUTPATIENT
Start: 2020-06-16 | End: 2020-06-18

## 2020-06-16 RX ORDER — DIPHENHYDRAMINE HCL 50 MG
25 CAPSULE ORAL EVERY 6 HOURS
Refills: 0 | Status: DISCONTINUED | OUTPATIENT
Start: 2020-06-16 | End: 2020-06-18

## 2020-06-16 RX ORDER — SODIUM CHLORIDE 9 MG/ML
1000 INJECTION, SOLUTION INTRAVENOUS
Refills: 0 | Status: DISCONTINUED | OUTPATIENT
Start: 2020-06-16 | End: 2020-06-18

## 2020-06-16 RX ORDER — IBUPROFEN 200 MG
600 TABLET ORAL EVERY 6 HOURS
Refills: 0 | Status: COMPLETED | OUTPATIENT
Start: 2020-06-16 | End: 2021-05-15

## 2020-06-16 RX ORDER — KETOROLAC TROMETHAMINE 30 MG/ML
30 SYRINGE (ML) INJECTION EVERY 6 HOURS
Refills: 0 | Status: DISCONTINUED | OUTPATIENT
Start: 2020-06-16 | End: 2020-06-17

## 2020-06-16 RX ORDER — CEFAZOLIN SODIUM 1 G
2000 VIAL (EA) INJECTION ONCE
Refills: 0 | Status: DISCONTINUED | OUTPATIENT
Start: 2020-06-16 | End: 2020-06-16

## 2020-06-16 RX ORDER — CHLORHEXIDINE GLUCONATE 213 G/1000ML
1 SOLUTION TOPICAL DAILY
Refills: 0 | Status: DISCONTINUED | OUTPATIENT
Start: 2020-06-16 | End: 2020-06-18

## 2020-06-16 RX ORDER — CITRIC ACID/SODIUM CITRATE 300-500 MG
30 SOLUTION, ORAL ORAL ONCE
Refills: 0 | Status: COMPLETED | OUTPATIENT
Start: 2020-06-16 | End: 2020-06-16

## 2020-06-16 RX ORDER — DEXAMETHASONE 0.5 MG/5ML
4 ELIXIR ORAL EVERY 6 HOURS
Refills: 0 | Status: DISCONTINUED | OUTPATIENT
Start: 2020-06-16 | End: 2020-06-16

## 2020-06-16 RX ORDER — MAGNESIUM HYDROXIDE 400 MG/1
30 TABLET, CHEWABLE ORAL
Refills: 0 | Status: DISCONTINUED | OUTPATIENT
Start: 2020-06-16 | End: 2020-06-18

## 2020-06-16 RX ORDER — ENOXAPARIN SODIUM 100 MG/ML
40 INJECTION SUBCUTANEOUS EVERY 24 HOURS
Refills: 0 | Status: DISCONTINUED | OUTPATIENT
Start: 2020-06-17 | End: 2020-06-18

## 2020-06-16 RX ADMIN — Medication 975 MILLIGRAM(S): at 16:00

## 2020-06-16 RX ADMIN — SODIUM CHLORIDE 125 MILLILITER(S): 9 INJECTION, SOLUTION INTRAVENOUS at 16:22

## 2020-06-16 RX ADMIN — Medication 30 MILLILITER(S): at 12:15

## 2020-06-16 RX ADMIN — Medication 100 MILLIGRAM(S): at 12:15

## 2020-06-16 RX ADMIN — FAMOTIDINE 20 MILLIGRAM(S): 10 INJECTION INTRAVENOUS at 12:15

## 2020-06-16 RX ADMIN — Medication 1000 MILLIUNIT(S)/MIN: at 16:22

## 2020-06-16 RX ADMIN — Medication 30 MILLIGRAM(S): at 23:36

## 2020-06-16 RX ADMIN — Medication 30 MILLIGRAM(S): at 18:00

## 2020-06-16 RX ADMIN — CHLORHEXIDINE GLUCONATE 1 APPLICATION(S): 213 SOLUTION TOPICAL at 11:30

## 2020-06-16 NOTE — DISCHARGE NOTE OB - KEGEL (VAGINAL TIGHTENING) EXERCISES TO PROMOTE HEALING
Last visit - 12/28/16  Labs - CMP/Hgb A1C/Lipid 11/11/16, CBC 10/7/16  F/U - 4/5/2017    Notes Recorded by Marques Alfaro MD on 1/23/2017 at 5:44 PM  Osteopenia: use ca++ with vit d3  Consider miacalcin spray  Elvira dexa scan 3 years    Called the patient and discussed the results and recommendations from Dr. Alfaro r/t the BD Dexa scan.  She was told that the results were c/w Osteopenia.  The recommendations for her to use Calcium with Vitamin D3, she confirmed that she has been and continues to use an OTC Ca/Vit D supplement.  Writer also discussed with her the recommendation from Dr. Alfaro for her to consider Miacalcin spray.  She had asked about side effects of the medication.  Writer relayed to her the following information:  Adverse Effects  Dermatologic: Flushing, Face or hands, Injection site reaction (10% ) Gastrointestinal: Nausea (up to 10% )  Musculoskeletal: Arthralgia (4% )  Respiratory: Epistaxis (4% ), Rhinitis (12% ), Sinusitis (1% to 3% )   The information was obtained from the [employer] (web links).  She stated that she was going to hold off on starting the medication for now and address the issue with Dr. Alfaro at her 4/5/2017 appt.  She v/u of the information discussed and all questions answered at this time.  She is aware of the recommendation for her to recheck her Dexa scan in 3 years    Order for F/U BD Dexa entered   - Cosign requested     Statement Selected

## 2020-06-16 NOTE — DISCHARGE NOTE OB - PATIENT PORTAL LINK FT
You can access the FollowMyHealth Patient Portal offered by [facility] by registering at the following website: [email or web address]. By joining [facility]’s FollowMyHealth portal, you will also be able to view your health information using other applications (apps) compatible with our system.

## 2020-06-16 NOTE — DISCHARGE NOTE OB - CARE PLAN
Principal Discharge DX:	Normal postpartum course  Goal:	HOME  Assessment and plan of treatment:	Nothin per vagna  no heavy lifting  for  six  weeks no sex  no  tub baths no  swimming

## 2020-06-16 NOTE — DISCHARGE NOTE OB - CARE PROVIDER_API CALL
Cedrick Lind  OBSTETRICS AND GYNECOLOGY  [address] [place] [address]  Phone: [phone]  Fax: [phone]  Follow Up Time:

## 2020-06-17 PROBLEM — Z00.00 ENCOUNTER FOR PREVENTIVE HEALTH EXAMINATION: Status: ACTIVE | Noted: 2020-06-17

## 2020-06-17 LAB
BASOPHILS # BLD AUTO: 0.02 K/UL — SIGNIFICANT CHANGE UP (ref 0–0.2)
BASOPHILS NFR BLD AUTO: 0.2 % — SIGNIFICANT CHANGE UP (ref 0–2)
EOSINOPHIL # BLD AUTO: 0.09 K/UL — SIGNIFICANT CHANGE UP (ref 0–0.5)
EOSINOPHIL NFR BLD AUTO: 0.9 % — SIGNIFICANT CHANGE UP (ref 0–6)
HCT VFR BLD CALC: 25.5 % — LOW (ref 34.5–45)
HGB BLD-MCNC: 8.1 G/DL — LOW (ref 11.5–15.5)
IMM GRANULOCYTES NFR BLD AUTO: 0.7 % — SIGNIFICANT CHANGE UP (ref 0–1.5)
LYMPHOCYTES # BLD AUTO: 1.67 K/UL — SIGNIFICANT CHANGE UP (ref 1–3.3)
LYMPHOCYTES # BLD AUTO: 16.4 % — SIGNIFICANT CHANGE UP (ref 13–44)
MCHC RBC-ENTMCNC: 26.9 PG — LOW (ref 27–34)
MCHC RBC-ENTMCNC: 31.8 GM/DL — LOW (ref 32–36)
MCV RBC AUTO: 84.7 FL — SIGNIFICANT CHANGE UP (ref 80–100)
MONOCYTES # BLD AUTO: 0.99 K/UL — HIGH (ref 0–0.9)
MONOCYTES NFR BLD AUTO: 9.7 % — SIGNIFICANT CHANGE UP (ref 2–14)
NEUTROPHILS # BLD AUTO: 7.33 K/UL — SIGNIFICANT CHANGE UP (ref 1.8–7.4)
NEUTROPHILS NFR BLD AUTO: 72.1 % — SIGNIFICANT CHANGE UP (ref 43–77)
NRBC # BLD: 0 /100 WBCS — SIGNIFICANT CHANGE UP (ref 0–0)
PLATELET # BLD AUTO: 191 K/UL — SIGNIFICANT CHANGE UP (ref 150–400)
RBC # BLD: 3.01 M/UL — LOW (ref 3.8–5.2)
RBC # FLD: 18.6 % — HIGH (ref 10.3–14.5)
T PALLIDUM AB TITR SER: NEGATIVE — SIGNIFICANT CHANGE UP
WBC # BLD: 10.17 K/UL — SIGNIFICANT CHANGE UP (ref 3.8–10.5)
WBC # FLD AUTO: 10.17 K/UL — SIGNIFICANT CHANGE UP (ref 3.8–10.5)

## 2020-06-17 RX ORDER — IBUPROFEN 200 MG
600 TABLET ORAL EVERY 6 HOURS
Refills: 0 | Status: DISCONTINUED | OUTPATIENT
Start: 2020-06-17 | End: 2020-06-18

## 2020-06-17 RX ORDER — ZINC OXIDE 200 MG/G
1 OINTMENT TOPICAL DAILY
Refills: 0 | Status: DISCONTINUED | OUTPATIENT
Start: 2020-06-17 | End: 2020-06-18

## 2020-06-17 RX ADMIN — Medication 600 MILLIGRAM(S): at 17:36

## 2020-06-17 RX ADMIN — Medication 30 MILLIGRAM(S): at 06:40

## 2020-06-17 RX ADMIN — Medication 975 MILLIGRAM(S): at 16:20

## 2020-06-17 RX ADMIN — Medication 30 MILLIGRAM(S): at 12:02

## 2020-06-17 RX ADMIN — Medication 975 MILLIGRAM(S): at 22:32

## 2020-06-17 RX ADMIN — Medication 975 MILLIGRAM(S): at 09:21

## 2020-06-17 RX ADMIN — ENOXAPARIN SODIUM 40 MILLIGRAM(S): 100 INJECTION SUBCUTANEOUS at 06:40

## 2020-06-17 RX ADMIN — SIMETHICONE 80 MILLIGRAM(S): 80 TABLET, CHEWABLE ORAL at 22:32

## 2020-06-18 VITALS
DIASTOLIC BLOOD PRESSURE: 81 MMHG | TEMPERATURE: 98 F | HEART RATE: 102 BPM | RESPIRATION RATE: 18 BRPM | SYSTOLIC BLOOD PRESSURE: 126 MMHG | OXYGEN SATURATION: 96 %

## 2020-06-18 RX ORDER — TETANUS TOXOID, REDUCED DIPHTHERIA TOXOID AND ACELLULAR PERTUSSIS VACCINE, ADSORBED 5; 2.5; 8; 8; 2.5 [IU]/.5ML; [IU]/.5ML; UG/.5ML; UG/.5ML; UG/.5ML
0.5 SUSPENSION INTRAMUSCULAR ONCE
Refills: 0 | Status: COMPLETED | OUTPATIENT
Start: 2020-06-18 | End: 2020-06-18

## 2020-06-18 RX ADMIN — Medication 0.5 MILLILITER(S): at 10:17

## 2020-06-18 RX ADMIN — Medication 600 MILLIGRAM(S): at 05:51

## 2020-06-18 RX ADMIN — ENOXAPARIN SODIUM 40 MILLIGRAM(S): 100 INJECTION SUBCUTANEOUS at 05:51

## 2020-06-18 RX ADMIN — Medication 600 MILLIGRAM(S): at 11:39

## 2020-06-18 RX ADMIN — TETANUS TOXOID, REDUCED DIPHTHERIA TOXOID AND ACELLULAR PERTUSSIS VACCINE, ADSORBED 0.5 MILLILITER(S): 5; 2.5; 8; 8; 2.5 SUSPENSION INTRAMUSCULAR at 10:31

## 2020-06-18 RX ADMIN — ZINC OXIDE 1 APPLICATION(S): 200 OINTMENT TOPICAL at 11:40

## 2020-06-18 RX ADMIN — Medication 600 MILLIGRAM(S): at 00:27

## 2020-06-18 NOTE — PROGRESS NOTE ADULT - SUBJECTIVE AND OBJECTIVE BOX
Patient evaluated at bedside.   She reports pain is well controlled with OPM.  She has been ambulating without assistance, voiding spontaneously, passing gas, tolerating regular diet and is breastfeeding.    She denies HA, dizziness, CP, palpitations, SOB, n/v, or heavy vaginal bleeding.    Physical Exam:  Vital Signs Last 24 Hrs  T(C): 36.8 (18 Jun 2020 09:14), Max: 36.9 (17 Jun 2020 18:35)  T(F): 98.2 (18 Jun 2020 09:14), Max: 98.4 (17 Jun 2020 18:35)  HR: 102 (18 Jun 2020 09:14) (69 - 102)  BP: 126/81 (18 Jun 2020 09:14) (101/68 - 126/81)  BP(mean): --  RR: 18 (18 Jun 2020 09:14) (17 - 18)  SpO2: 96% (18 Jun 2020 09:14) (96% - 98%)    Gen: NAD  Abd: + BS, soft, nontender, nondistended, no rebound or guarding  Incision clean, dry and intact  uterus firm at midline  : lochia WNL  Extremities: no swelling or calf tenderness                          8.1    10.17 )-----------( 191      ( 17 Jun 2020 07:03 )             25.5       MEDICATIONS  (STANDING):  acetaminophen   Tablet .. 975 milliGRAM(s) Oral <User Schedule>  chlorhexidine 2% Cloths 1 Application(s) Topical daily  enoxaparin Injectable 40 milliGRAM(s) SubCutaneous every 24 hours  ibuprofen  Tablet. 600 milliGRAM(s) Oral every 6 hours  lactated ringers. 1000 milliLiter(s) (125 mL/Hr) IV Continuous <Continuous>  oxytocin Infusion 333.333 milliUNIT(s)/Min (1000 mL/Hr) IV Continuous <Continuous>  zinc oxide 20% Ointment 1 Application(s) Topical daily    MEDICATIONS  (PRN):  diphenhydrAMINE 25 milliGRAM(s) Oral every 6 hours PRN Itching  lanolin Ointment 1 Application(s) Topical every 6 hours PRN Sore Nipples  magnesium hydroxide Suspension 30 milliLiter(s) Oral two times a day PRN Constipation  oxyCODONE    IR 5 milliGRAM(s) Oral every 3 hours PRN Moderate to Severe Pain (4-10)  oxyCODONE    IR 5 milliGRAM(s) Oral once PRN Moderate to Severe Pain (4-10)  simethicone 80 milliGRAM(s) Chew every 4 hours PRN Gas
Patient evaluated at bedside.   She reports pain is well controlled.  s/p Godoy  No Flatus  Not OOB yet.    She denies HA, dizziness, CP, palpitations, SOB, n/v, or heavy vaginal bleeding.    Physical Exam:  Vital Signs Last 24 Hrs  T(C): 36.6 (17 Jun 2020 06:00), Max: 36.9 (16 Jun 2020 22:35)  T(F): 97.8 (17 Jun 2020 06:00), Max: 98.5 (16 Jun 2020 22:35)  HR: 92 (17 Jun 2020 06:00) (75 - 94)  BP: 96/56 (17 Jun 2020 06:00) (96/56 - 127/72)  BP(mean): --  RR: 18 (17 Jun 2020 06:00) (14 - 18)  SpO2: 98% (17 Jun 2020 06:00) (97% - 100%)    Gen: NAD  Abd: + BS, soft, nontender, nondistended, no rebound or guarding  Incision clean, dry and intact  uterus firm at midline  : lochia WNL  Extremities: no swelling or calf tenderness                          8.1    10.17 )-----------( 191      ( 17 Jun 2020 07:03 )             25.5     MEDICATIONS  (STANDING):  acetaminophen   Tablet .. 975 milliGRAM(s) Oral <User Schedule>  chlorhexidine 2% Cloths 1 Application(s) Topical daily  diphtheria/tetanus/pertussis (acellular) Vaccine (ADAcel) 0.5 milliLiter(s) IntraMuscular once  enoxaparin Injectable 40 milliGRAM(s) SubCutaneous every 24 hours  ibuprofen  Tablet. 600 milliGRAM(s) Oral every 6 hours  ketorolac   Injectable 30 milliGRAM(s) IV Push every 6 hours  lactated ringers. 1000 milliLiter(s) (125 mL/Hr) IV Continuous <Continuous>  oxytocin Infusion 333.333 milliUNIT(s)/Min (1000 mL/Hr) IV Continuous <Continuous>    MEDICATIONS  (PRN):  diphenhydrAMINE 25 milliGRAM(s) Oral every 6 hours PRN Itching  lanolin Ointment 1 Application(s) Topical every 6 hours PRN Sore Nipples  magnesium hydroxide Suspension 30 milliLiter(s) Oral two times a day PRN Constipation  oxyCODONE    IR 5 milliGRAM(s) Oral every 3 hours PRN Moderate to Severe Pain (4-10)  oxyCODONE    IR 5 milliGRAM(s) Oral once PRN Moderate to Severe Pain (4-10)  simethicone 80 milliGRAM(s) Chew every 4 hours PRN Gas
 Section Operative Note      Pre-Op Diagnosis:  quarternary cesareabn sectoin      Post-Op Diagnosis:  same       Time Out: 	[x ] Performed		[ ]Not Performed:  Procedure: 	 Section: 	[x ] Repeat 	#__4_____	[ ] Primary         [ ]Emergency	        [ ]Other____________:  Uterine incision:         [ x]Low Transverse C/S	[ ]Low Vertical C/S           [ ]Classical C/S							  Additional Procedures: 	[ ] Bilateral Tubal Ligation.  Type:______________  Other:	[ x]Lysis of Adhesions   	[ ]C-Hysterectomy          [ ]Other__________________:  Surgeon:  Dr. Bermudez                                                   .	Assist:   ____Dr mariscal _______________.                                                                             .   Anesthesia: _______Dr Azar_______________________	Type: [ ]Epidural  [x ] Spinal   [ ] General	[ ]Other:  IV Fluids:1500  cc  ivrl 2g  ancef  20  u  pitocin 			Urine Output:	150cc	Godoy:  [ x] Yes [ ]No [ ] Other:  EBL:   900cc 		  Delivery findings:    [x ] Live 	[ ]Non-Viable: 	[x ]MALE   [ ]FEMALE, Infant. APGAR   9           AND       9          .  [ x] Peds at delivery.  [ ]MULTIPLE GESTATION -NOTES:      POSITION:      LOT                  PRESENTATION      VTX                          .  DELIVERED BY:  	[x ]HEAD 		[ ]BREECH 	[ ]OTHER:  		[ x]MANUAL 		[ ]VACUUM	[ ] OTHER:	  PLACENTA: 	[x ]Removed	[ x]Uterus explored		[x ]Empty		[ ]Other 		  		UTERUS:  	[ x]Normal		[ ]Fibroids		[ x] Other:DENSELY  ADHERENT  TO  ANTERIOR  ABDOMINAL WALL   ADNEXA: 	[ x]Right Normal	[ ]Other:  	[x ]Left Normal	[ ]Other :  PELVIS:	[ Normal		[X ]Adhesions [ ]Mild  [ ]Moderate [ X]Severe  Procedure:  	Patient in supine position.    Skin Incision	[x ]Pfannenstiel	[ ]Other:			[ ]Old scar  removal.  		Fascial Incision	[ x]Transverse 	[ ]Other:		  Peritoneal incision	[x ]Performed	[ x]Vertical  [ ]Other:		[X ]Lysis of Adhesions  		Bladder Flap	[ ]Created	[X ]Not Created  		Uterine Incision	[x]Low transverse	[ ]Low Vertical	[ ]Classical  [X ]Other: MIDLEVERL  TRANSVERSE   	Uterine Repair	[ x]_#_1_______Layers-Using__1 chromic______________ sutures 	[x ] hemostasis  excellent.  				[ ]Other:                       .                      Abdominal Cavity	[x ]Cleared of clots and debris  Rectus  Muscles  	Reapproximated [ ]Yes Using__1 chromic______________ sutures. [ X]Not Re- Approximated.  Fascial Reapproximation 	[ x]UsingUsing___1 Vicril_____________ sutures [ ]Other:                                 .	  Subcutaneous Tissue 	[x ]Irrigated  and hemostasis assured:[x ]Reapproximated Using____2-0____________ sutures.  Skin Reapproximation:	[x ]Subcuticular Using___3-0  MONOCRIL _____________ sutureS    Dressing applied  and Patient to RR in  [x ] Stable [ ] Other ;                         condition.	  End of Operation;	[x ] Sponge/lap/needle count correct.  [ ] Not correct.  [ ]Not Done [ ]X-ray=Clear  Pathology:	[ x]Placenta.   	[ ]Fallopian Tube Portions [ ]Right [ ]Left.	[ ]Other:                                           	Complications/Attending’s Notes:	[ ] None.  	[ ] Other:                                                                        [ ]CONTINUATION OF NOTES NEXT PAGE:

## 2020-06-18 NOTE — PROGRESS NOTE ADULT - ASSESSMENT
35y Female POD#1    s/p repeat C/Sx4, Uncomplicated. Post operative Hgb 8.1 this morning. Patient was counseled on extensive adhesions during her surgery and high transverse incision. Patient was counseled that she should strongly consider long acting contraception at this point.                                  1. Neuro/Pain:  OPM  2  CV:  VS per routine  3. Pulm: Encourage ISS & Ambulation  4. GI:  Reg  5. : Voiding  6. DVT ppx: SCDs, lovenox 40mg qD  7. Dispo: POD #2
35y Female POD#2    s/p repeat C/Sx4, Uncomplicated. Post operative Hgb 8.1 this morning. Patient was counseled on extensive adhesions during her surgery and high transverse incision. Patient was counseled that she should strongly consider long acting contraception at this point.                                  1. Neuro/Pain:  OPM  2  CV:  VS per routine  3. Pulm: Encourage ISS & Ambulation  4. GI:  Reg  5. : Voiding  6. DVT ppx: SCDs, lovenox 40mg qD  7. Dispo: POD #2
PO

## 2020-06-19 LAB — SURGICAL PATHOLOGY STUDY: SIGNIFICANT CHANGE UP

## 2023-01-28 NOTE — DISCHARGE NOTE OB - NURSING SECTION COMPLETE
DISPLAY PLAN FREE TEXT DISPLAY PLAN FREE TEXT DISPLAY PLAN FREE TEXT DISPLAY PLAN FREE TEXT Patient/Caregiver provided printed discharge information.